# Patient Record
Sex: FEMALE | Race: BLACK OR AFRICAN AMERICAN | NOT HISPANIC OR LATINO | Employment: UNEMPLOYED | URBAN - METROPOLITAN AREA
[De-identification: names, ages, dates, MRNs, and addresses within clinical notes are randomized per-mention and may not be internally consistent; named-entity substitution may affect disease eponyms.]

---

## 2020-06-10 PROBLEM — R41.82 AMS (ALTERED MENTAL STATUS): Status: ACTIVE | Noted: 2020-06-10

## 2020-06-10 PROBLEM — R41.82 ALTERED MENTAL STATUS: Status: ACTIVE | Noted: 2020-06-10

## 2020-06-10 PROBLEM — E87.6 HYPOKALEMIA: Status: ACTIVE | Noted: 2020-06-10

## 2020-06-10 PROBLEM — E11.9 DM2 (DIABETES MELLITUS, TYPE 2): Status: ACTIVE | Noted: 2020-06-10

## 2020-06-10 PROBLEM — I10 ESSENTIAL HYPERTENSION: Status: ACTIVE | Noted: 2020-06-10

## 2020-06-10 PROBLEM — I63.9 CVA (CEREBRAL VASCULAR ACCIDENT): Status: ACTIVE | Noted: 2020-06-10

## 2020-06-11 PROBLEM — E11.9 DM2 (DIABETES MELLITUS, TYPE 2): Status: RESOLVED | Noted: 2020-06-10 | Resolved: 2020-06-11

## 2020-06-11 PROBLEM — G93.40 ACUTE ENCEPHALOPATHY: Status: ACTIVE | Noted: 2020-06-11

## 2020-06-11 PROBLEM — G93.9 BRAIN LESION: Status: ACTIVE | Noted: 2020-06-11

## 2020-06-11 PROBLEM — R91.1 LUNG NODULE: Status: ACTIVE | Noted: 2020-06-11

## 2020-06-12 PROBLEM — E87.6 HYPOKALEMIA: Status: RESOLVED | Noted: 2020-06-10 | Resolved: 2020-06-12

## 2020-06-17 PROBLEM — G93.89 THALAMIC MASS: Status: ACTIVE | Noted: 2020-06-17

## 2020-06-22 PROBLEM — R10.816 EPIGASTRIC ABDOMINAL TENDERNESS: Status: ACTIVE | Noted: 2020-06-22

## 2020-06-22 PROBLEM — D86.89 SARCOIDOSIS OF CENTRAL NERVOUS SYSTEM: Status: ACTIVE | Noted: 2020-06-22

## 2020-06-22 PROBLEM — D72.829 LEUKOCYTOSIS: Status: ACTIVE | Noted: 2020-06-22

## 2020-06-24 PROBLEM — N17.9 AKI (ACUTE KIDNEY INJURY): Status: ACTIVE | Noted: 2020-06-24

## 2020-06-24 PROBLEM — K59.00 CONSTIPATION: Status: ACTIVE | Noted: 2020-06-24

## 2020-06-29 PROBLEM — D72.829 LEUKOCYTOSIS: Status: RESOLVED | Noted: 2020-06-22 | Resolved: 2020-06-29

## 2020-07-01 PROBLEM — R41.82 FLUCTUATING MENTAL STATUS: Status: RESOLVED | Noted: 2020-06-10 | Resolved: 2020-07-01

## 2020-07-01 PROBLEM — N17.9 AKI (ACUTE KIDNEY INJURY): Status: RESOLVED | Noted: 2020-06-24 | Resolved: 2020-07-01

## 2020-07-01 PROBLEM — K59.00 CONSTIPATION: Status: RESOLVED | Noted: 2020-06-24 | Resolved: 2020-07-01

## 2020-07-01 PROBLEM — E11.9 DM2 (DIABETES MELLITUS, TYPE 2): Status: RESOLVED | Noted: 2020-06-10 | Resolved: 2020-07-01

## 2020-07-02 ENCOUNTER — NURSE TRIAGE (OUTPATIENT)
Dept: ADMINISTRATIVE | Facility: CLINIC | Age: 40
End: 2020-07-02

## 2020-07-02 NOTE — TELEPHONE ENCOUNTER
Pt denies any fever, cough, or difficulty breathing since procedure. Advised pt if these symptoms do arise to contact [facility] or PCP. No follow Contacted pt on behalf of [facility] [employer]. Pt verified by first and last name and . up needed.      Reason for Disposition   General information question, no triage required and triager able to answer question    Protocols used: INFORMATION ONLY CALL - NO TRIAGE-A-

## 2022-04-13 PROBLEM — E87.6 HYPOKALEMIA: Status: ACTIVE | Noted: 2022-04-13

## 2022-04-13 PROBLEM — R41.82 ALTERED MENTAL STATUS, UNSPECIFIED: Status: ACTIVE | Noted: 2022-04-13

## 2022-04-13 PROBLEM — G93.89 BRAIN MASS: Status: ACTIVE | Noted: 2022-04-13

## 2022-04-14 PROBLEM — G93.89 BRAIN MASS: Status: RESOLVED | Noted: 2022-04-13 | Resolved: 2022-04-14

## 2022-04-14 PROBLEM — E87.6 HYPOKALEMIA: Status: RESOLVED | Noted: 2022-04-13 | Resolved: 2022-04-14
